# Patient Record
Sex: MALE | Race: BLACK OR AFRICAN AMERICAN | NOT HISPANIC OR LATINO | URBAN - METROPOLITAN AREA
[De-identification: names, ages, dates, MRNs, and addresses within clinical notes are randomized per-mention and may not be internally consistent; named-entity substitution may affect disease eponyms.]

---

## 2020-09-24 ENCOUNTER — EMERGENCY (EMERGENCY)
Facility: HOSPITAL | Age: 30
LOS: 1 days | Discharge: HOME | End: 2020-09-24
Attending: EMERGENCY MEDICINE
Payer: COMMERCIAL

## 2020-09-24 VITALS
HEART RATE: 70 BPM | TEMPERATURE: 98 F | RESPIRATION RATE: 19 BRPM | DIASTOLIC BLOOD PRESSURE: 83 MMHG | SYSTOLIC BLOOD PRESSURE: 137 MMHG | OXYGEN SATURATION: 96 %

## 2020-09-24 VITALS
DIASTOLIC BLOOD PRESSURE: 80 MMHG | SYSTOLIC BLOOD PRESSURE: 125 MMHG | RESPIRATION RATE: 19 BRPM | TEMPERATURE: 98 F | HEART RATE: 63 BPM | OXYGEN SATURATION: 97 %

## 2020-09-24 LAB
ALBUMIN SERPL ELPH-MCNC: 4.1 G/DL — SIGNIFICANT CHANGE UP (ref 3.3–5.2)
ALP SERPL-CCNC: 70 U/L — SIGNIFICANT CHANGE UP (ref 40–120)
ALT FLD-CCNC: 22 U/L — SIGNIFICANT CHANGE UP
ANION GAP SERPL CALC-SCNC: 14 MMOL/L — SIGNIFICANT CHANGE UP (ref 5–17)
APTT BLD: 34.3 SEC — SIGNIFICANT CHANGE UP (ref 27.5–35.5)
AST SERPL-CCNC: 35 U/L — SIGNIFICANT CHANGE UP
BASOPHILS # BLD AUTO: 0.03 K/UL — SIGNIFICANT CHANGE UP (ref 0–0.2)
BASOPHILS NFR BLD AUTO: 0.4 % — SIGNIFICANT CHANGE UP (ref 0–2)
BILIRUB SERPL-MCNC: 0.3 MG/DL — LOW (ref 0.4–2)
BLD GP AB SCN SERPL QL: SIGNIFICANT CHANGE UP
BUN SERPL-MCNC: 12 MG/DL — SIGNIFICANT CHANGE UP (ref 8–20)
CALCIUM SERPL-MCNC: 9.1 MG/DL — SIGNIFICANT CHANGE UP (ref 8.6–10.2)
CHLORIDE SERPL-SCNC: 104 MMOL/L — SIGNIFICANT CHANGE UP (ref 98–107)
CO2 SERPL-SCNC: 23 MMOL/L — SIGNIFICANT CHANGE UP (ref 22–29)
CREAT SERPL-MCNC: 0.93 MG/DL — SIGNIFICANT CHANGE UP (ref 0.5–1.3)
EOSINOPHIL # BLD AUTO: 0.32 K/UL — SIGNIFICANT CHANGE UP (ref 0–0.5)
EOSINOPHIL NFR BLD AUTO: 4.4 % — SIGNIFICANT CHANGE UP (ref 0–6)
ETHANOL SERPL-MCNC: <10 MG/DL — SIGNIFICANT CHANGE UP
GLUCOSE SERPL-MCNC: 83 MG/DL — SIGNIFICANT CHANGE UP (ref 70–99)
HCT VFR BLD CALC: 39.1 % — SIGNIFICANT CHANGE UP (ref 39–50)
HGB BLD-MCNC: 12.7 G/DL — LOW (ref 13–17)
IMM GRANULOCYTES NFR BLD AUTO: 0.3 % — SIGNIFICANT CHANGE UP (ref 0–1.5)
INR BLD: 1.1 RATIO — SIGNIFICANT CHANGE UP (ref 0.88–1.16)
LYMPHOCYTES # BLD AUTO: 2.61 K/UL — SIGNIFICANT CHANGE UP (ref 1–3.3)
LYMPHOCYTES # BLD AUTO: 35.9 % — SIGNIFICANT CHANGE UP (ref 13–44)
MCHC RBC-ENTMCNC: 25.6 PG — LOW (ref 27–34)
MCHC RBC-ENTMCNC: 32.5 GM/DL — SIGNIFICANT CHANGE UP (ref 32–36)
MCV RBC AUTO: 78.8 FL — LOW (ref 80–100)
MONOCYTES # BLD AUTO: 0.71 K/UL — SIGNIFICANT CHANGE UP (ref 0–0.9)
MONOCYTES NFR BLD AUTO: 9.8 % — SIGNIFICANT CHANGE UP (ref 2–14)
NEUTROPHILS # BLD AUTO: 3.58 K/UL — SIGNIFICANT CHANGE UP (ref 1.8–7.4)
NEUTROPHILS NFR BLD AUTO: 49.2 % — SIGNIFICANT CHANGE UP (ref 43–77)
PLATELET # BLD AUTO: 251 K/UL — SIGNIFICANT CHANGE UP (ref 150–400)
POTASSIUM SERPL-MCNC: 3.7 MMOL/L — SIGNIFICANT CHANGE UP (ref 3.5–5.3)
POTASSIUM SERPL-SCNC: 3.7 MMOL/L — SIGNIFICANT CHANGE UP (ref 3.5–5.3)
PROT SERPL-MCNC: 7 G/DL — SIGNIFICANT CHANGE UP (ref 6.6–8.7)
PROTHROM AB SERPL-ACNC: 12.7 SEC — SIGNIFICANT CHANGE UP (ref 10.6–13.6)
RBC # BLD: 4.96 M/UL — SIGNIFICANT CHANGE UP (ref 4.2–5.8)
RBC # FLD: 13.8 % — SIGNIFICANT CHANGE UP (ref 10.3–14.5)
SARS-COV-2 RNA SPEC QL NAA+PROBE: SIGNIFICANT CHANGE UP
SODIUM SERPL-SCNC: 141 MMOL/L — SIGNIFICANT CHANGE UP (ref 135–145)
WBC # BLD: 7.27 K/UL — SIGNIFICANT CHANGE UP (ref 3.8–10.5)
WBC # FLD AUTO: 7.27 K/UL — SIGNIFICANT CHANGE UP (ref 3.8–10.5)

## 2020-09-24 PROCEDURE — 71045 X-RAY EXAM CHEST 1 VIEW: CPT

## 2020-09-24 PROCEDURE — 96374 THER/PROPH/DIAG INJ IV PUSH: CPT | Mod: XU

## 2020-09-24 PROCEDURE — 73070 X-RAY EXAM OF ELBOW: CPT

## 2020-09-24 PROCEDURE — 73590 X-RAY EXAM OF LOWER LEG: CPT

## 2020-09-24 PROCEDURE — 70450 CT HEAD/BRAIN W/O DYE: CPT | Mod: 26

## 2020-09-24 PROCEDURE — 72125 CT NECK SPINE W/O DYE: CPT | Mod: 26

## 2020-09-24 PROCEDURE — 85610 PROTHROMBIN TIME: CPT

## 2020-09-24 PROCEDURE — 73502 X-RAY EXAM HIP UNI 2-3 VIEWS: CPT

## 2020-09-24 PROCEDURE — 99282 EMERGENCY DEPT VISIT SF MDM: CPT

## 2020-09-24 PROCEDURE — 73502 X-RAY EXAM HIP UNI 2-3 VIEWS: CPT | Mod: 26,RT

## 2020-09-24 PROCEDURE — 70450 CT HEAD/BRAIN W/O DYE: CPT

## 2020-09-24 PROCEDURE — 86901 BLOOD TYPING SEROLOGIC RH(D): CPT

## 2020-09-24 PROCEDURE — 36415 COLL VENOUS BLD VENIPUNCTURE: CPT

## 2020-09-24 PROCEDURE — 71260 CT THORAX DX C+: CPT | Mod: 26

## 2020-09-24 PROCEDURE — 73562 X-RAY EXAM OF KNEE 3: CPT | Mod: 26,RT

## 2020-09-24 PROCEDURE — 80307 DRUG TEST PRSMV CHEM ANLYZR: CPT

## 2020-09-24 PROCEDURE — 72125 CT NECK SPINE W/O DYE: CPT

## 2020-09-24 PROCEDURE — 86900 BLOOD TYPING SEROLOGIC ABO: CPT

## 2020-09-24 PROCEDURE — 99285 EMERGENCY DEPT VISIT HI MDM: CPT

## 2020-09-24 PROCEDURE — 73562 X-RAY EXAM OF KNEE 3: CPT

## 2020-09-24 PROCEDURE — 74177 CT ABD & PELVIS W/CONTRAST: CPT

## 2020-09-24 PROCEDURE — 71045 X-RAY EXAM CHEST 1 VIEW: CPT | Mod: 26

## 2020-09-24 PROCEDURE — 86850 RBC ANTIBODY SCREEN: CPT

## 2020-09-24 PROCEDURE — 73590 X-RAY EXAM OF LOWER LEG: CPT | Mod: 26,RT

## 2020-09-24 PROCEDURE — 73070 X-RAY EXAM OF ELBOW: CPT | Mod: 26,RT

## 2020-09-24 PROCEDURE — 85730 THROMBOPLASTIN TIME PARTIAL: CPT

## 2020-09-24 PROCEDURE — U0003: CPT

## 2020-09-24 PROCEDURE — 74177 CT ABD & PELVIS W/CONTRAST: CPT | Mod: 26

## 2020-09-24 PROCEDURE — 71260 CT THORAX DX C+: CPT

## 2020-09-24 PROCEDURE — 99285 EMERGENCY DEPT VISIT HI MDM: CPT | Mod: 25

## 2020-09-24 PROCEDURE — 80053 COMPREHEN METABOLIC PANEL: CPT

## 2020-09-24 PROCEDURE — 85025 COMPLETE CBC W/AUTO DIFF WBC: CPT

## 2020-09-24 PROCEDURE — 99053 MED SERV 10PM-8AM 24 HR FAC: CPT

## 2020-09-24 RX ORDER — FENTANYL CITRATE 50 UG/ML
50 INJECTION INTRAVENOUS ONCE
Refills: 0 | Status: DISCONTINUED | OUTPATIENT
Start: 2020-09-24 | End: 2020-09-24

## 2020-09-24 RX ADMIN — FENTANYL CITRATE 50 MICROGRAM(S): 50 INJECTION INTRAVENOUS at 09:42
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
## 2020-09-24 NOTE — ED PROVIDER NOTE - PHYSICAL EXAMINATION
A: airway intact  B: CTAB  C: radial and femoral pulses bilaterally.   GCS 15    Gen: Well appearing in NAD  Head: NC/AT, PERRL. EOMI  Neck: trachea midline, c-collar  Resp:  No distress, CTAB, no crepitus, no chest wall ttp  GI: soft, NTND  : normal genitalia  Ext: Weakness and ttp to RLE, No deformity. +mid thoracic midline ttp. +R elbow ttp, no deformity.   Neuro:  A&O appears non focal  Skin:  Warm and dry as visualized  Psych:  Normal affect and mood

## 2020-09-24 NOTE — PROGRESS NOTE ADULT - SUBJECTIVE AND OBJECTIVE BOX
Tertiary Trauma Survey (TTS)    Date of TTS: 09-24-20 @ 10:58                             Admit Date: 09-24-20 @ 07:32      Trauma Activation:      Subjective / 24 hour events: Patient evaluated at bedside. No acute distress. reports feeling well with pain in the RLE. Thoracic tenderness has resolved and C-spine was cleared by ED. Patient is voiding spontaneously.    Vital Signs Last 24 Hrs  T(C): 36.7 (24 Sep 2020 07:33), Max: 36.7 (24 Sep 2020 07:33)  T(F): 98.1 (24 Sep 2020 07:33), Max: 98.1 (24 Sep 2020 07:33)  HR: 63 (24 Sep 2020 07:33) (63 - 63)  BP: 125/80 (24 Sep 2020 07:33) (125/80 - 125/80)  BP(mean): --  RR: 19 (24 Sep 2020 07:33) (19 - 19)  SpO2: 97% (24 Sep 2020 07:33) (97% - 97%)    Physical Exam:    Neuro: [x] non focal neurological exam, strength is 5/5 bilateral with limited strength due to pain in RLE.    HEENT: [x] Normo-cephalic/atraumatic  [ ] abnormalities noted to be:    Pulm/Chest:  [X] CTA b/l  [ ] chest wall non tender  [ ] abnormalities noted to be:    Cardiac: [x ] S1S2, sinus rhythm  [ ] abnormalities noted to be:     GI / Abdomen: [x ] Soft, non-tender, non-distended [ ] abnormalities noted to be:    Musculoskeletal / Extremities: [x ]normal active ROM  [x]  abnormalities noted to be, tenderness to palpation over the right calf    Integumentary: [x] Skin intact [ ] Warm [ ] Dry [ ]abnormalities noted to be:    Vascular: [x] 2+ palpable distal pulses  [ ] DOUGLAS:       [ ] abnormalities noted to be:      List Injuries Identified to Date:  -Right calf tenderness    RADIOLOGICAL FINDINGS REVIEW:  CXR: no acute pathology    Head CT: negative for acute hemorrhage or fractures    C-Spine CT: negative for acute fracture    Neck CT: negative for acute fracture    Chest CT: negative for acute fracture    ABD/Pelvis CT: negative for acute fracture

## 2020-09-24 NOTE — ED PROVIDER NOTE - PATIENT PORTAL LINK FT
You can access the FollowMyHealth Patient Portal offered by [facility] by registering at the following website: [email or web address]. By joining [employer]’s FollowMyHealth portal, you will also be able to view your health information using other applications (apps) compatible with our system.

## 2020-09-24 NOTE — H&P ADULT - ATTENDING COMMENTS
I have seen and examined patient on arrival  ABCD intact  HD normal  CXR no pTX  Pan scanned: on my review no inures.  Scans negative for injuires.  tertiary survey.  dispo per ED

## 2020-09-24 NOTE — H&P ADULT - GASTROINTESTINAL DETAILS
no guarding/no rebound tenderness/soft/no distention/nontender no rebound tenderness/no rigidity/no guarding/no masses palpable/soft/nontender/no distention

## 2020-09-24 NOTE — ED PROVIDER NOTE - CARE PROVIDER_API CALL
Eulalio Rivera (DO)  Orthopedics  [address] [place] [address]  Phone: [phone]  Fax: [phone]  Follow Up Time: 4-6 Days
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
## 2020-09-24 NOTE — ED PROVIDER NOTE - PROGRESS NOTE DETAILS
Jayden WOOD: patient cleared by trauma. Feeling better, only endorses R calf pain, compartments soft, normal DP pulse. Able to ambulate but painful in calf when walking, Patient provided with crutches, will follow with ortho. Strict return precautions given.
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
## 2021-04-20 ENCOUNTER — EMERGENCY (EMERGENCY)
Facility: HOSPITAL | Age: 31
LOS: 1 days | Discharge: HOME | End: 2021-04-20
Attending: EMERGENCY MEDICINE
Payer: COMMERCIAL

## 2021-04-20 VITALS
RESPIRATION RATE: 20 BRPM | TEMPERATURE: 98 F | WEIGHT: 169.98 LBS | OXYGEN SATURATION: 99 % | SYSTOLIC BLOOD PRESSURE: 143 MMHG | HEART RATE: 61 BPM | HEIGHT: 70 IN | DIASTOLIC BLOOD PRESSURE: 86 MMHG

## 2021-04-20 VITALS
RESPIRATION RATE: 16 BRPM | DIASTOLIC BLOOD PRESSURE: 77 MMHG | OXYGEN SATURATION: 100 % | HEART RATE: 60 BPM | TEMPERATURE: 98 F | SYSTOLIC BLOOD PRESSURE: 130 MMHG

## 2021-04-20 PROBLEM — Z78.9 OTHER SPECIFIED HEALTH STATUS: Chronic | Status: ACTIVE | Noted: 2020-09-24

## 2021-04-20 LAB
ALBUMIN SERPL ELPH-MCNC: 4.3 G/DL — SIGNIFICANT CHANGE UP (ref 3.3–5.2)
ALP SERPL-CCNC: 72 U/L — SIGNIFICANT CHANGE UP (ref 40–120)
ALT FLD-CCNC: 22 U/L — SIGNIFICANT CHANGE UP
ANION GAP SERPL CALC-SCNC: 10 MMOL/L — SIGNIFICANT CHANGE UP (ref 5–17)
AST SERPL-CCNC: 22 U/L — SIGNIFICANT CHANGE UP
BASOPHILS # BLD AUTO: 0.03 K/UL — SIGNIFICANT CHANGE UP (ref 0–0.2)
BASOPHILS NFR BLD AUTO: 0.4 % — SIGNIFICANT CHANGE UP (ref 0–2)
BILIRUB SERPL-MCNC: 0.3 MG/DL — LOW (ref 0.4–2)
BUN SERPL-MCNC: 13 MG/DL — SIGNIFICANT CHANGE UP (ref 8–20)
CALCIUM SERPL-MCNC: 9.2 MG/DL — SIGNIFICANT CHANGE UP (ref 8.6–10.2)
CHLORIDE SERPL-SCNC: 102 MMOL/L — SIGNIFICANT CHANGE UP (ref 98–107)
CO2 SERPL-SCNC: 25 MMOL/L — SIGNIFICANT CHANGE UP (ref 22–29)
CREAT SERPL-MCNC: 0.74 MG/DL — SIGNIFICANT CHANGE UP (ref 0.5–1.3)
EOSINOPHIL # BLD AUTO: 0.24 K/UL — SIGNIFICANT CHANGE UP (ref 0–0.5)
EOSINOPHIL NFR BLD AUTO: 2.8 % — SIGNIFICANT CHANGE UP (ref 0–6)
GLUCOSE SERPL-MCNC: 85 MG/DL — SIGNIFICANT CHANGE UP (ref 70–99)
HCT VFR BLD CALC: 43.6 % — SIGNIFICANT CHANGE UP (ref 39–50)
HGB BLD-MCNC: 13.5 G/DL — SIGNIFICANT CHANGE UP (ref 13–17)
IMM GRANULOCYTES NFR BLD AUTO: 0.4 % — SIGNIFICANT CHANGE UP (ref 0–1.5)
LYMPHOCYTES # BLD AUTO: 2.47 K/UL — SIGNIFICANT CHANGE UP (ref 1–3.3)
LYMPHOCYTES # BLD AUTO: 29.2 % — SIGNIFICANT CHANGE UP (ref 13–44)
MCHC RBC-ENTMCNC: 24.9 PG — LOW (ref 27–34)
MCHC RBC-ENTMCNC: 31 GM/DL — LOW (ref 32–36)
MCV RBC AUTO: 80.3 FL — SIGNIFICANT CHANGE UP (ref 80–100)
MONOCYTES # BLD AUTO: 0.83 K/UL — SIGNIFICANT CHANGE UP (ref 0–0.9)
MONOCYTES NFR BLD AUTO: 9.8 % — SIGNIFICANT CHANGE UP (ref 2–14)
NEUTROPHILS # BLD AUTO: 4.87 K/UL — SIGNIFICANT CHANGE UP (ref 1.8–7.4)
NEUTROPHILS NFR BLD AUTO: 57.4 % — SIGNIFICANT CHANGE UP (ref 43–77)
PLATELET # BLD AUTO: 279 K/UL — SIGNIFICANT CHANGE UP (ref 150–400)
POTASSIUM SERPL-MCNC: 4.3 MMOL/L — SIGNIFICANT CHANGE UP (ref 3.5–5.3)
POTASSIUM SERPL-SCNC: 4.3 MMOL/L — SIGNIFICANT CHANGE UP (ref 3.5–5.3)
PROT SERPL-MCNC: 7.3 G/DL — SIGNIFICANT CHANGE UP (ref 6.6–8.7)
RBC # BLD: 5.43 M/UL — SIGNIFICANT CHANGE UP (ref 4.2–5.8)
RBC # FLD: 13.6 % — SIGNIFICANT CHANGE UP (ref 10.3–14.5)
SODIUM SERPL-SCNC: 137 MMOL/L — SIGNIFICANT CHANGE UP (ref 135–145)
WBC # BLD: 8.47 K/UL — SIGNIFICANT CHANGE UP (ref 3.8–10.5)
WBC # FLD AUTO: 8.47 K/UL — SIGNIFICANT CHANGE UP (ref 3.8–10.5)

## 2021-04-20 PROCEDURE — 36415 COLL VENOUS BLD VENIPUNCTURE: CPT

## 2021-04-20 PROCEDURE — 85025 COMPLETE CBC W/AUTO DIFF WBC: CPT

## 2021-04-20 PROCEDURE — 99284 EMERGENCY DEPT VISIT MOD MDM: CPT

## 2021-04-20 PROCEDURE — 99284 EMERGENCY DEPT VISIT MOD MDM: CPT | Mod: 25

## 2021-04-20 PROCEDURE — 96365 THER/PROPH/DIAG IV INF INIT: CPT

## 2021-04-20 PROCEDURE — 90471 IMMUNIZATION ADMIN: CPT

## 2021-04-20 PROCEDURE — 73140 X-RAY EXAM OF FINGER(S): CPT | Mod: 26,LT

## 2021-04-20 PROCEDURE — 90715 TDAP VACCINE 7 YRS/> IM: CPT

## 2021-04-20 PROCEDURE — 80053 COMPREHEN METABOLIC PANEL: CPT

## 2021-04-20 PROCEDURE — 73140 X-RAY EXAM OF FINGER(S): CPT

## 2021-04-20 RX ORDER — IBUPROFEN 200 MG
600 TABLET ORAL ONCE
Refills: 0 | Status: DISCONTINUED | OUTPATIENT
Start: 2021-04-20 | End: 2021-04-20

## 2021-04-20 RX ORDER — ACETAMINOPHEN 500 MG
650 TABLET ORAL ONCE
Refills: 0 | Status: COMPLETED | OUTPATIENT
Start: 2021-04-20 | End: 2021-04-20

## 2021-04-20 RX ORDER — TETANUS TOXOID, REDUCED DIPHTHERIA TOXOID AND ACELLULAR PERTUSSIS VACCINE, ADSORBED 5; 2.5; 8; 8; 2.5 [IU]/.5ML; [IU]/.5ML; UG/.5ML; UG/.5ML; UG/.5ML
0.5 SUSPENSION INTRAMUSCULAR ONCE
Refills: 0 | Status: COMPLETED | OUTPATIENT
Start: 2021-04-20 | End: 2021-04-20

## 2021-04-20 RX ORDER — AZTREONAM 2 G
1 VIAL (EA) INJECTION
Qty: 20 | Refills: 0
Start: 2021-04-20 | End: 2021-04-29

## 2021-04-20 RX ORDER — CEFAZOLIN SODIUM 1 G
2000 VIAL (EA) INJECTION ONCE
Refills: 0 | Status: COMPLETED | OUTPATIENT
Start: 2021-04-20 | End: 2021-04-20

## 2021-04-20 RX ORDER — AMPICILLIN SODIUM AND SULBACTAM SODIUM 250; 125 MG/ML; MG/ML
3 INJECTION, POWDER, FOR SUSPENSION INTRAMUSCULAR; INTRAVENOUS ONCE
Refills: 0 | Status: DISCONTINUED | OUTPATIENT
Start: 2021-04-20 | End: 2021-04-20

## 2021-04-20 RX ADMIN — Medication 100 MILLIGRAM(S): at 12:08

## 2021-04-20 RX ADMIN — Medication 2000 MILLIGRAM(S): at 12:36

## 2021-04-20 RX ADMIN — Medication 650 MILLIGRAM(S): at 11:38

## 2021-04-20 RX ADMIN — TETANUS TOXOID, REDUCED DIPHTHERIA TOXOID AND ACELLULAR PERTUSSIS VACCINE, ADSORBED 0.5 MILLILITER(S): 5; 2.5; 8; 8; 2.5 SUSPENSION INTRAMUSCULAR at 11:38

## 2021-04-20 NOTE — ED PROVIDER NOTE - PROGRESS NOTE DETAILS
STEPHANI Marquez NOTE: Xray reviewed, spoke to orthopedics team, pending evaluation and recommendations STEPHANI Marquez NOTE: Seen by ortho (hand) service who recommends Rx doxy, bactrim and f/u with Dr. Jaeger tomorrow in office. Reviewed all results and plan with Dr. Cardenas. Pt stable for d/c, reports improvement, VSS, tolerating PO, ambulatory.  Discussion includes results, plan, proper medication use/side effects, and return precautions. Pt advised to f/u with PMD 1-2 days and specialists discussed.  Printed copies of available lab/radiology results contained within discharge packet. Pt verbalized understanding/agreement of plan.

## 2021-04-20 NOTE — ED ADULT TRIAGE NOTE - CHIEF COMPLAINT QUOTE
Pt arrives to ED c/o L hand middle hand swelling  " I had a piece of glass at work and I removed it but today ai woke up with swollen  finger "

## 2021-04-20 NOTE — CONSULT NOTE ADULT - SUBJECTIVE AND OBJECTIVE BOX
ORTHO-HAND SERVICE    Pt Name: PHOENIX FLORES    MRN: [id number]      Patient is a 30y Male presenting to the emergency department with a chief complaint of pain and swelling to left 3rd digit.  Pt states he was at work yesterday and picked up a grabage bag which had a piece of glass sticking out of the bag.  Pt states the glass punctured the digit, he was able to remove it but still experiencing pain and swelling.  Pt right hand dominant.  Denies numbness and tingling currently but states yesterday his left hand was numb.  C/O limited motion to the left 3rd digit.   Denies fever, n/v      REVIEW OF SYSTEMS    General: Alert, responsive, in NAD    Skin/Breast: No rashes, no pruritis     Musculoskeletal: SEE HPI.    Neurological: No sensory or motor changes.     Endocrine: No Hx of diabetes.    ROS is otherwise negative.    PAST MEDICAL & SURGICAL HISTORY:  No pertinent past medical history    No significant past surgical history        Allergies: No Known Allergies      Medications: none    FAMILY HISTORY:  No pertinent family history in first degree relatives    : non-contributory    Social History:       Daily Height in cm: 177.8 (20 Apr 2021 10:34)    Daily                             13.5   8.47  )-----------( 279      ( 20 Apr 2021 11:43 )             43.6       04-20    137  |  102  |  13.0  ----------------------------<  85  4.3   |  25.0  |  0.74    Ca    9.2      20 Apr 2021 11:43    TPro  7.3  /  Alb  4.3  /  TBili  0.3<L>  /  DBili  x   /  AST  22  /  ALT  22  /  AlkPhos  72  04-20        PHYSICAL EXAM:      Appearance: Alert, responsive, in no acute distress.  Left hand 3rd digit with +swelling, no erythema, no ecchymosis.  pincture wound noted to middle phalanx area.  no discharge  limited flexion of the 3rd/4th digit secondary to swelling  sensation intact, pulses palp           Imaging Studies: +foreign body noted to soft tissue at the proximal aspect of the middle phalanx    A/P:  Pt is a  30y Male with    found to have +cellulitis/foreign body left 3rd digit    PLAN:   1 dose IV abx in ED  home on doxy and bactrim  pain control  follow up with Dr. Jaeger in the AM  Discussed with Dr. Jaeger

## 2021-04-20 NOTE — ED PROVIDER NOTE - ATTENDING CONTRIBUTION TO CARE
Dr. Cardenas : I have personally seen and examined this patient at the bedside. I have fully participated in the care of this patient. I have reviewed all pertinent clinical information, including history, physical exam, plan and the PA's note and agree except as noted.     31 y/o M with no PMHx presents to ED c/o left 3rd digit pain and swelling after injury yesterday. Pt works as , got a piece of glass in his finger which he removed. + numbness to distal hand and digits which improved to just 3rd digit this morning. +pain    Last known TDAP > 10 yrs ago      Denies f/c/n/v/cp/sob/palpitations/cough/abd.pain/d/c/dysuria/hematuria. sick contacts/recent travel.    PE:  ue: left: + ttp to left 3rd digit + swelling minimal redness tto tp pip and prox phalanx cap refil <2 sec sensation intact      -->finger infection concern for tenosynovitis as holding finger in flexion ttp + swelling; hand consult abx- labs reassess

## 2021-04-20 NOTE — ED PROVIDER NOTE - CARE PROVIDER_API CALL
Kota Jaeger)  Orthopaedic Surgery; Surgery of the Hand  [address] [place] [address]  Phone: [phone]  Fax: [phone]  Scheduled Appointment: 04/21/2021

## 2021-04-20 NOTE — ED PROVIDER NOTE - CLINICAL SUMMARY MEDICAL DECISION MAKING FREE TEXT BOX
31 y/o M with no PMHx presents to ED c/o left 3rd digit pain and swelling after injury yesterday. Pt works as , got a piece of glass in his finger which he removed. After he got the glass he reports numbness to distal hand and digits which improved to just 3rd digit this morning. Reports pain with movement of finger.  -Will check xray, give abx, analgesics and hand consult

## 2021-04-20 NOTE — ED PROVIDER NOTE - NSFOLLOWUPINSTRUCTIONS_ED_ALL_ED_FT
- Please follow up with your Primary Care Doctor in 1 - 2 days. If you cannot follow-up with your primary care doctor please return to the Emergency Department for any urgent issues.  - Seek immediate medical care for any new, worsening or concerning signs or symptoms.   - Take medications as directed, be sure to read all instructions on packaging, Be sure to complete entire course of antibiotics as directed   - You were given copies of all your test results, please bring to your primary care doctor for review of any abnormal test results  - Follow up with HAND SURGEON TOMORROW (4/21/21) IN HIS OFFICE     - If you have difficulty following up, please call: [phone] or go to [email or web address] to obtain a [facility] doctor or specialist who takes your insurance in your area.    Feel better!

## 2021-04-20 NOTE — ED PROVIDER NOTE - PHYSICAL EXAMINATION
Vital signs noted, see flowsheet.  General: NAD, well appearing and non-toxic.  HEENT: NC/AT. MMM. Conjunctiva and sclera clear b/l.  EOMI. PERRL.  Neck: Soft and supple  Cardiac: RRR. +S1/S2. Peripheral pulses 2+ and symmetric b/l.   Respiratory: CTA  Neuro: Awake, alert and oriented to person/place/time/situation.   Psych: Normal affect     Left upper extremity: No tenderness to wrist or digits 1/2, 4/5. Tenderness over 3rd digit. Small puncture wound to middle phalanx without discharge or bleeding. Erythema of 3rd digit. Flexion limited of 3rd digit secondary to swelling otherwise + FROM. Capillary refill < 2 seconds. Sensation intact.

## 2021-04-20 NOTE — ED PROVIDER NOTE - PATIENT PORTAL LINK FT
You can access the FollowMyHealth Patient Portal offered by [facility] by registering at the following website: [email or web address]. By joining [employer]’s FollowMyHealth portal, you will also be able to view your health information using other applications (apps) compatible with our system.

## 2021-04-20 NOTE — ED PROVIDER NOTE - OBJECTIVE STATEMENT
29 y/o M with no PMHx presents to ED c/o left 3rd digit pain and swelling after injury yesterday. Pt works as , got a piece of glass in his finger which he removed. After he got the glass he reports numbness to distal hand and digits which improved to just 3rd digit this morning. Reports pain with movement of finger. Pt is right handed.     Last known TDAP > 10 yrs ago

## 2022-04-09 ENCOUNTER — EMERGENCY (EMERGENCY)
Facility: HOSPITAL | Age: 32
LOS: 0 days | Discharge: HOME | End: 2022-04-09
Attending: EMERGENCY MEDICINE
Payer: COMMERCIAL

## 2022-04-09 VITALS — WEIGHT: 182.98 LBS | HEIGHT: 70 IN

## 2022-04-09 VITALS
HEART RATE: 84 BPM | DIASTOLIC BLOOD PRESSURE: 69 MMHG | RESPIRATION RATE: 18 BRPM | TEMPERATURE: 99 F | SYSTOLIC BLOOD PRESSURE: 120 MMHG | OXYGEN SATURATION: 100 %

## 2022-04-09 DIAGNOSIS — R00.2 PALPITATIONS: ICD-10-CM

## 2022-04-09 DIAGNOSIS — M54.9 DORSALGIA, UNSPECIFIED: ICD-10-CM

## 2022-04-09 PROCEDURE — 96372 THER/PROPH/DIAG INJ SC/IM: CPT

## 2022-04-09 PROCEDURE — 99284 EMERGENCY DEPT VISIT MOD MDM: CPT

## 2022-04-09 RX ORDER — CYCLOBENZAPRINE HYDROCHLORIDE 10 MG/1
1 TABLET, FILM COATED ORAL
Qty: 10 | Refills: 0
Start: 2022-04-09 | End: 2022-04-11

## 2022-04-09 RX ORDER — KETOROLAC TROMETHAMINE 30 MG/ML
15 SYRINGE (ML) INJECTION ONCE
Refills: 0 | Status: DISCONTINUED | OUTPATIENT
Start: 2022-04-09 | End: 2022-04-09

## 2022-04-09 RX ADMIN — Medication 15 MILLIGRAM(S): at 17:27
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
## 2022-04-09 NOTE — ED STATDOCS - PATIENT PORTAL LINK FT
You can access the FollowMyHealth Patient Portal offered by [facility] by registering at the following website: [email or web address]. By joining [employer]’s FollowMyHealth portal, you will also be able to view your health information using other applications (apps) compatible with our system.

## 2022-04-09 NOTE — ED STATDOCS - MUSCULOSKELETAL, MLM
range of motion is not limited. right paraspinal lumbar TTP. no midline TTP. 5/5 strength in all extremities.

## 2022-04-09 NOTE — ED STATDOCS - CLINICAL SUMMARY MEDICAL DECISION MAKING FREE TEXT BOX
No midline spinal tenderness.  No f/c.  No IVDU hx.  No LE neuro symptoms.  No bowel or bladder retention or incontinence.  No red flags for dangerous etiology of back pain. No midline spinal tenderness.  No f/c.  No IVDU hx.  No LE neuro symptoms.  No bowel or bladder retention or incontinence.  No red flags for dangerous etiology of back pain.  Encouraged supportive care.  D/c home with strict return precautions and prompt outpatient f/u.

## 2022-05-11 ENCOUNTER — EMERGENCY (EMERGENCY)
Facility: HOSPITAL | Age: 32
LOS: 1 days | Discharge: HOME | End: 2022-05-11
Attending: EMERGENCY MEDICINE
Payer: SELF-PAY

## 2022-05-11 VITALS
DIASTOLIC BLOOD PRESSURE: 79 MMHG | SYSTOLIC BLOOD PRESSURE: 126 MMHG | HEIGHT: 70 IN | HEART RATE: 55 BPM | WEIGHT: 179.9 LBS | TEMPERATURE: 98 F | RESPIRATION RATE: 16 BRPM | OXYGEN SATURATION: 99 %

## 2022-05-11 PROCEDURE — G1004: CPT

## 2022-05-11 PROCEDURE — 70450 CT HEAD/BRAIN W/O DYE: CPT | Mod: 26,MG

## 2022-05-11 PROCEDURE — 99284 EMERGENCY DEPT VISIT MOD MDM: CPT | Mod: 25

## 2022-05-11 PROCEDURE — 70450 CT HEAD/BRAIN W/O DYE: CPT | Mod: MG

## 2022-05-11 PROCEDURE — 99284 EMERGENCY DEPT VISIT MOD MDM: CPT

## 2022-05-11 NOTE — ED STATDOCS - PROGRESS NOTE DETAILS
Elli Foster PA :  PT evaluated by intake physician. HPI/PE/ROS as noted above. Will follow up plan per intake physician  Ct brain negative.  Pt neurologically intact in ED, states his blurred vision is "foggy" and intermittent, at present no visual changes and is able to ambulate and text on his phone easily with no visual changes. D/W Dr. Mayberry -madonna dc with close ophtho follow up. Left a message with Dr. Givens's office for follow up appt tomorrow 5/12. Pt aware of need for follow up and return to ER precautions.

## 2022-05-11 NOTE — ED STATDOCS - CARE PROVIDER_API CALL
Maciel Givens; PhD)  Ophthalmology  [address] [facility]  [address]  [place] [address]  Phone: [phone]  Fax: [phone]  Follow Up Time:     Kamilah Adams (DO)  EEGEpilepsy; Neurology  [address] [place] [address]  Phone: [phone]  Fax: [phone]  Follow Up Time:

## 2022-05-11 NOTE — ED STATDOCS - EYES, MLM
extra ocular muscles intact. pupils reactive. visual acuity:  left eye 20/30 right eye Pt says he can't see.
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
## 2022-05-11 NOTE — ED STATDOCS - NSICDXNOPASTMEDICALHX_GEN_ALL_ED
PT MORE AWAKE. REPORTS NAUSEA  DECREASING. ICE CHIPS W/O PROBLEMS. REPORTS INCREASE IN INCISIONAL PAIN AT LEFT HAND. RATES 7/10.  PT STATES LEFT HAND  9/10 WITH PALPATION PRIOR SURGERY.  PT DROWSY/EASILY AROUSED. CONVERSANT.   <-- Click to add NO pertinent Past Medical History

## 2022-05-11 NOTE — ED STATDOCS - NSFOLLOWUPINSTRUCTIONS_ED_ALL_ED_FT
Please follow up with ophthalmology tomorrow  Follow up with neurologist within 1 week  Return to ER for new or worsening symptoms
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
## 2022-05-11 NOTE — ED STATDOCS - NS ED ATTENDING STATEMENT MOD
This was a shared visit with the CAPRICE. I reviewed and verified the documentation and independently performed the documented:

## 2022-05-11 NOTE — ED STATDOCS - PATIENT PORTAL LINK FT
You can access the FollowMyHealth Patient Portal offered by [facility] by registering at the following website: [email or web address]. By joining [employer]’s FollowMyHealth portal, you will also be able to view your health information using other applications (apps) compatible with our system.

## 2025-05-30 NOTE — H&P ADULT - GLASGOW COMA SCALE: BEST VERBAL RESPONSE, MLM
Mom called needing to reschedule today wellness exam. I have cancelled appointment but please give mom a call.      Phone # Conf: [phone]   (V5) oriented